# Patient Record
Sex: FEMALE | Race: WHITE | HISPANIC OR LATINO | URBAN - METROPOLITAN AREA
[De-identification: names, ages, dates, MRNs, and addresses within clinical notes are randomized per-mention and may not be internally consistent; named-entity substitution may affect disease eponyms.]

---

## 2024-08-02 ENCOUNTER — OFFICE (OUTPATIENT)
Dept: URBAN - METROPOLITAN AREA CLINIC 19 | Facility: CLINIC | Age: 54
End: 2024-08-02

## 2024-08-02 VITALS
SYSTOLIC BLOOD PRESSURE: 153 MMHG | HEART RATE: 74 BPM | WEIGHT: 293 LBS | OXYGEN SATURATION: 100 % | DIASTOLIC BLOOD PRESSURE: 72 MMHG | HEIGHT: 72 IN

## 2024-08-02 DIAGNOSIS — K21.9 GASTRO-ESOPHAGEAL REFLUX DISEASE WITHOUT ESOPHAGITIS: ICD-10-CM

## 2024-08-02 DIAGNOSIS — Z86.010 PERSONAL HISTORY OF COLONIC POLYPS: ICD-10-CM

## 2024-08-02 DIAGNOSIS — I48.91 UNSPECIFIED ATRIAL FIBRILLATION: ICD-10-CM

## 2024-08-02 DIAGNOSIS — K58.9 IRRITABLE BOWEL SYNDROME WITHOUT DIARRHEA: ICD-10-CM

## 2024-08-02 PROCEDURE — 99204 OFFICE O/P NEW MOD 45 MIN: CPT

## 2024-08-02 RX ORDER — FAMOTIDINE 40 MG/1
40 TABLET, FILM COATED ORAL
Qty: 90 | Refills: 2 | Status: ACTIVE

## 2024-08-02 RX ORDER — PANTOPRAZOLE 40 MG/1
40 TABLET, DELAYED RELEASE ORAL
Qty: 90 | Refills: 2 | Status: ACTIVE

## 2024-08-02 NOTE — SERVICENOTES
The patient's assessment was reviewed with Prakash Moreno MD and a collaborative plan of care was established.

## 2024-08-02 NOTE — SERVICEHPINOTES
54 year old  white female here today to establish GI care and refill her GI medications. Her previous GI provider (Abisai Montoya MD) retired. her chronic GERD seems well controlled with Protonix 40 mg/d and Pepcid 40 mg at bedtime.  She has a history of IBS mixed, but more diarrhea and loose stools than constipation. She uses Imodium and OTC laxatives as needed. She denies dysphagia, nausea, abdominal pain, change in bowel habit or any overt GI bleeding.
br
brShe is followed by Chapincito Castellon MD (cardiology) for atrial fibrillation s/p ablation in 2020 (off blood thinners).br
br  EGD/colonoscopy 7/31/19 found a small hiatal hernia, lower esophageal GERD without gastritis, chronic active gastritis involving the stomach body, diffuse diverticular disease, diffuse colon spasms, a redundant colon, focal colitis involving the sigmoid colon, nonbleeding internal hemorrhoids and a small hyperplastic colon polyp was removed. Biopsies were negative for H. pylori. Colonoscopy 11/12/15 found extensive diffuse pan diverticular disease, diffuse colon spasms, large internal hemorrhoids, external skin tags and 2 large (over 1.5 cm) colon polyps were removed (Pathology report requested for review).  EGD 2/17/16 found moderate-sized hiatal hernia, lower esophagitis, GERD into the mid esophagus and focal antral gastritis  (Pathology report requested for review).
br
brFamily history is negative for colon neoplasm.

## 2025-08-06 ENCOUNTER — OFFICE (OUTPATIENT)
Dept: URBAN - METROPOLITAN AREA CLINIC 19 | Facility: CLINIC | Age: 55
End: 2025-08-06
Payer: COMMERCIAL

## 2025-08-06 VITALS
DIASTOLIC BLOOD PRESSURE: 66 MMHG | HEART RATE: 78 BPM | OXYGEN SATURATION: 98 % | HEIGHT: 72 IN | WEIGHT: 293 LBS | SYSTOLIC BLOOD PRESSURE: 137 MMHG

## 2025-08-06 DIAGNOSIS — K58.9 IRRITABLE BOWEL SYNDROME, UNSPECIFIED: ICD-10-CM

## 2025-08-06 DIAGNOSIS — Z86.0100 PERSONAL HISTORY OF COLON POLYPS, UNSPECIFIED: ICD-10-CM

## 2025-08-06 DIAGNOSIS — I48.91 UNSPECIFIED ATRIAL FIBRILLATION: ICD-10-CM

## 2025-08-06 DIAGNOSIS — K21.9 GASTRO-ESOPHAGEAL REFLUX DISEASE WITHOUT ESOPHAGITIS: ICD-10-CM

## 2025-08-06 PROCEDURE — 99214 OFFICE O/P EST MOD 30 MIN: CPT | Performed by: NURSE PRACTITIONER

## 2025-08-06 RX ORDER — FAMOTIDINE 40 MG/1
TABLET, FILM COATED ORAL
Qty: 90 | Refills: 3 | Status: ACTIVE

## 2025-08-06 RX ORDER — PANTOPRAZOLE 40 MG/1
40 TABLET, DELAYED RELEASE ORAL
Qty: 90 | Refills: 3 | Status: ACTIVE